# Patient Record
Sex: FEMALE | Race: WHITE | NOT HISPANIC OR LATINO | Employment: FULL TIME | URBAN - NONMETROPOLITAN AREA
[De-identification: names, ages, dates, MRNs, and addresses within clinical notes are randomized per-mention and may not be internally consistent; named-entity substitution may affect disease eponyms.]

---

## 2017-08-24 ENCOUNTER — TRANSCRIBE ORDERS (OUTPATIENT)
Dept: MAMMOGRAPHY | Facility: HOSPITAL | Age: 42
End: 2017-08-24

## 2017-08-24 DIAGNOSIS — Z13.9 SCREENING: Primary | ICD-10-CM

## 2017-09-12 ENCOUNTER — HOSPITAL ENCOUNTER (OUTPATIENT)
Dept: MAMMOGRAPHY | Facility: HOSPITAL | Age: 42
Discharge: HOME | End: 2017-09-12
Admitting: PHYSICIAN ASSISTANT

## 2017-09-12 DIAGNOSIS — Z13.9 SCREENING: ICD-10-CM

## 2017-09-12 PROCEDURE — G0202 SCR MAMMO BI INCL CAD: HCPCS

## 2017-09-12 PROCEDURE — 77063 BREAST TOMOSYNTHESIS BI: CPT

## 2018-05-23 ENCOUNTER — TRANSCRIBE ORDERS (OUTPATIENT)
Dept: ADMINISTRATIVE | Facility: HOSPITAL | Age: 43
End: 2018-05-23

## 2018-05-23 DIAGNOSIS — Z12.39 SCREENING BREAST EXAMINATION: Primary | ICD-10-CM

## 2018-05-29 RX ORDER — POTASSIUM CHLORIDE 750 MG/1
10 CAPSULE, EXTENDED RELEASE ORAL 2 TIMES DAILY
COMMUNITY

## 2018-05-29 RX ORDER — LEVOCETIRIZINE DIHYDROCHLORIDE 5 MG/1
5 TABLET, FILM COATED ORAL EVERY EVENING
COMMUNITY

## 2018-05-29 RX ORDER — TRAZODONE HYDROCHLORIDE 50 MG/1
50 TABLET ORAL NIGHTLY
COMMUNITY

## 2018-05-29 RX ORDER — HYDROCHLOROTHIAZIDE 25 MG/1
25 TABLET ORAL DAILY
COMMUNITY

## 2018-05-30 ENCOUNTER — OFFICE VISIT (OUTPATIENT)
Dept: SURGERY | Facility: CLINIC | Age: 43
End: 2018-05-30

## 2018-05-30 VITALS
BODY MASS INDEX: 45.75 KG/M2 | OXYGEN SATURATION: 98 % | HEIGHT: 64 IN | DIASTOLIC BLOOD PRESSURE: 96 MMHG | WEIGHT: 268 LBS | HEART RATE: 98 BPM | SYSTOLIC BLOOD PRESSURE: 138 MMHG | RESPIRATION RATE: 18 BRPM | TEMPERATURE: 98 F

## 2018-05-30 DIAGNOSIS — L98.9 SKIN LESIONS: Primary | ICD-10-CM

## 2018-05-30 DIAGNOSIS — D49.2 SKIN NEOPLASM: ICD-10-CM

## 2018-05-30 PROCEDURE — 99243 OFF/OP CNSLTJ NEW/EST LOW 30: CPT | Performed by: SURGERY

## 2018-05-30 NOTE — PROGRESS NOTES
"Patient: Melissa Klein    YOB: 1975    Date: 05/30/2018    Primary Care Provider: SERGIO Perdomo    Chief Complaint   Patient presents with   • Skin Lesion       Subjective .     History of present illness:  Pt is here today for evaluation of multiple irritated skin neoplasms which are located in bilateral axillary areas and the area \"around bra line,\" they have been present for @ many years.  Pt complains of \"irritation\" and \"redness and pain when they get caught on something.\"  Pt denies injury and/or trauma and she denies drainage at the sites.They do seem to have grown in size over the past year, they are tender to touch, they are irregular in nature, and seemed to have changed size and color recently.    The following portions of the patient's history were reviewed and updated as appropriate: allergies, current medications, past family history, past medical history, past social history, past surgical history and problem list.      Review of Systems   Constitutional: Negative for chills, fever and unexpected weight change.   HENT: Negative for hearing loss, trouble swallowing and voice change.    Eyes: Negative for visual disturbance.   Respiratory: Negative for apnea, cough, chest tightness, shortness of breath and wheezing.    Cardiovascular: Negative for chest pain, palpitations and leg swelling.   Gastrointestinal: Negative for abdominal distention, abdominal pain, anal bleeding, blood in stool, constipation, diarrhea, nausea, rectal pain and vomiting.   Endocrine: Negative for cold intolerance and heat intolerance.   Genitourinary: Negative for difficulty urinating, dysuria and flank pain.   Musculoskeletal: Negative for back pain and gait problem.   Skin: Positive for color change. Negative for rash and wound.   Neurological: Negative for dizziness, syncope, speech difficulty, weakness, light-headedness, numbness and headaches.   Hematological: Negative for adenopathy. Does " "not bruise/bleed easily.   Psychiatric/Behavioral: Negative for confusion. The patient is not nervous/anxious.        Allergies:  Allergies   Allergen Reactions   • Adhesive Tape Rash   • Latex Rash       Medications:    Current Outpatient Prescriptions:   •  hydrochlorothiazide (HYDRODIURIL) 25 MG tablet, Take 25 mg by mouth Daily., Disp: , Rfl:   •  levocetirizine (XYZAL) 5 MG tablet, Take 5 mg by mouth Every Evening., Disp: , Rfl:   •  potassium chloride (MICRO-K) 10 MEQ CR capsule, Take 10 mEq by mouth 2 (Two) Times a Day., Disp: , Rfl:   •  traZODone (DESYREL) 50 MG tablet, Take 50 mg by mouth Every Night., Disp: , Rfl:     History\"  Past Medical History:   Diagnosis Date   • Fatty liver    • Hypertension        Past Surgical History:   Procedure Laterality Date   • CHOLECYSTECTOMY     • TUBAL ABDOMINAL LIGATION         Family History   Problem Relation Age of Onset   • Diabetes Paternal Aunt        Social History   Substance Use Topics   • Smoking status: Former Smoker   • Smokeless tobacco: Never Used   • Alcohol use No        Objective     Vital Signs:   Vitals:    05/30/18 1253   BP: 138/96   Pulse: 98   Resp: 18   Temp: 98 °F (36.7 °C)   TempSrc: Temporal Artery    SpO2: 98%   Weight: 122 kg (268 lb)   Height: 162.6 cm (64\")       Physical Exam:   General Appearance:    Alert, cooperative, in no acute distress   Head:    Normocephalic, without obvious abnormality, atraumatic   Eyes:            Lids and lashes normal, conjunctivae and sclerae normal, no   icterus, no pallor, corneas clear, PERRLA   Ears:    Ears appear intact with no abnormalities noted   Throat:   No oral lesions, no thrush, oral mucosa moist   Neck:   No adenopathy, supple, trachea midline, no thyromegaly, no   carotid bruit, no JVD   Lungs:     Clear to auscultation,respirations regular, even and                  unlabored    Heart:    Regular rhythm and normal rate, normal S1 and S2, no            murmur, no gallop, no rub, no click "   Chest Wall:    No abnormalities observed   Abdomen:     Normal bowel sounds, no masses, no organomegaly, soft        non-tender, non-distended, no guarding, no rebound                tenderness   Extremities:   Moves all extremities well, no edema, no cyanosis, no             redness   Pulses:   Pulses palpable and equal bilaterally   Skin:   No bleeding, bruising or rash, multiple skin lesions over the axilla and under the bra line   Lymph nodes:   No palpable adenopathy   Neurologic:   Cranial nerves 2 - 12 grossly intact, sensation intact, DTR       present and equal bilaterally     Results Review:   I reviewed the patient's new clinical results.  I reviewed the patient's new imaging results and agree with the interpretation.  I reviewed the patient's other test results and agree with the interpretation    Assessment/Plan     1. Skin lesions        I did have a detailed and extensive discussion with the patient in the office today.  The full risks and benefits of operative versus nonoperative intervention were discussed with the paient, they understand, agree, and wish to proceed with the surgical treatment plan of skin neoplasm removal.    I discussed the patients findings and my recommendations with patient.    Review of Systems was reviewed and confirmed as accurate today.    Electronically signed by Eliazar Hurtado MD  05/30/18      .    Portions of this note have been scribed for Eliazar Hurtado MD by Suki Smith. 5/30/2018  1:21 PM

## 2018-07-09 ENCOUNTER — PROCEDURE VISIT (OUTPATIENT)
Dept: SURGERY | Facility: CLINIC | Age: 43
End: 2018-07-09

## 2018-07-09 VITALS
HEART RATE: 69 BPM | WEIGHT: 268.96 LBS | TEMPERATURE: 98.9 F | BODY MASS INDEX: 45.92 KG/M2 | SYSTOLIC BLOOD PRESSURE: 132 MMHG | OXYGEN SATURATION: 98 % | DIASTOLIC BLOOD PRESSURE: 82 MMHG | HEIGHT: 64 IN

## 2018-07-09 DIAGNOSIS — R22.31 MASS OF RIGHT AXILLA: Primary | ICD-10-CM

## 2018-07-09 DIAGNOSIS — L98.9 SKIN LESION: Primary | ICD-10-CM

## 2018-07-09 PROCEDURE — 12032 INTMD RPR S/A/T/EXT 2.6-7.5: CPT | Performed by: SURGERY

## 2018-07-09 PROCEDURE — 11403 EXC TR-EXT B9+MARG 2.1-3CM: CPT | Performed by: SURGERY

## 2018-07-09 NOTE — PROGRESS NOTES
Procedure: Excision  Location: Right axilla x 2    I recommend excision. Procedure and the risks and benefits were explained including bleeding and infection. The patient understands these and wishes to proceed.     The patient was brought to the procedure room. Consent and time out were performed. The area was prepped and draped in the usual fashion. 1% lidocaine with epinephrine was infused locally. An ellyptical incision was made around the lesion. Full thickness excision was performed. The lesion size was 2.1 cm. The wound was closed in layers with interrupted simple vicryl and Nylon for the skin. Wound closure size was 3 cm. There were no complications and the patient tolerated the procedure well. Hemostasis was well controlled with pressure and there was minimal blood loss. Wound instructions were given. This was in the right axilla.    The patient was brought to the procedure room. Consent and time out were performed. The area was prepped and draped in the usual fashion. 1% lidocaine with epinephrine was infused locally. An ellyptical incision was made around the lesion. Full thickness excision was performed. The lesion size was 2.2 cm. The wound was closed in layers with interrupted simple vicryl and Nylon for the skin. Wound closure size was 3 cm. There were no complications and the patient tolerated the procedure well. Hemostasis was well controlled with pressure and there was minimal blood loss. Wound instructions were given. This was a separate incision in a separate location in the right axilla.

## 2018-07-16 ENCOUNTER — PROCEDURE VISIT (OUTPATIENT)
Dept: SURGERY | Facility: CLINIC | Age: 43
End: 2018-07-16

## 2018-07-16 VITALS
DIASTOLIC BLOOD PRESSURE: 84 MMHG | OXYGEN SATURATION: 98 % | HEIGHT: 64 IN | BODY MASS INDEX: 45.75 KG/M2 | WEIGHT: 268 LBS | HEART RATE: 68 BPM | SYSTOLIC BLOOD PRESSURE: 130 MMHG | RESPIRATION RATE: 18 BRPM | TEMPERATURE: 98.4 F

## 2018-07-16 DIAGNOSIS — L98.9 SKIN LESIONS: Primary | ICD-10-CM

## 2018-07-16 DIAGNOSIS — D49.2 SKIN NEOPLASM: Primary | ICD-10-CM

## 2018-07-16 PROCEDURE — 12032 INTMD RPR S/A/T/EXT 2.6-7.5: CPT | Performed by: SURGERY

## 2018-07-16 PROCEDURE — 11403 EXC TR-EXT B9+MARG 2.1-3CM: CPT | Performed by: SURGERY

## 2018-07-16 NOTE — PROGRESS NOTES
"Patient: Melissa Klein    YOB: 1975    Date: 07/16/2018    Primary Care Provider: SERGIO Perdomo    Reason for Consultation: Follow-up lesion excision @ her right axilla which was done in the office on 07/09/2018.  Pt is also here for excision skin lesion(s) on her trunk.    Chief Complaint   Patient presents with   • Post-op Follow-up     excision skin lesions bilateral axilla.   • Skin Lesion     on trunk.       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  They state that they have done well and are having no problems.  Pt also complains of multiple skin lesions (mole like) on her trunk at her \"bra line,\" she complains of redness and irrittion @ the site.  Pt stated \"they get irritated by my bra!\"      Vital Signs:  Vitals:    07/16/18 1056   BP: 130/84   Pulse: 68   Resp: 18   Temp: 98.4 °F (36.9 °C)   TempSrc: Temporal Artery    SpO2: 98%   Weight: 122 kg (268 lb)   Height: 162.6 cm (64\")       Physical Exam:   General Appearance:    Alert, cooperative, in no acute distress, wound clean dry without infection   Abdomen:     no masses, no organomegaly, soft non-tender, non-distended, no guarding, wounds are well healed   Chest:      Clear to ausculation     Results Review:   I reviewed the patient's new clinical results.  I reviewed the patient's new imaging results and agree with the interpretation.  I reviewed the patient's other test results and agree with the interpretation    Assessment / Plan:    1. Skin neoplasm        I did discuss the situation with the patient today in the office and they have done well from their recent lesion excision, I don't think that the patient needs any further intervention and I need to see them back only if they have further problems. Pathology report was " reviewed with the patient in the office.    We did perform skin lesion excision that were suspicious in nature underneath the right breast.    Location: Right chest wall    Procedure: Skin lesion excision ×2      I recommend excision. Procedure and the risks and benefits were explained including bleeding and infection. The patient understands these and wishes to proceed.     The patient was brought to the procedure room. Consent and time out were performed. The area was prepped and draped in the usual fashion. 1% lidocaine with epinephrine was infused locally. An ellyptical incision was made around the lesion. Full thickness excision was performed. The lesion size was 2 cm. The wound was closed in layers with interrupted simple vicryl and Nylon for the skin. Wound closure size was 2.1 cm. There were no complications and the patient tolerated the procedure well. Hemostasis was well controlled with pressure and there was minimal blood loss. Wound instructions were given.     I should note that there was another skin lesion of the right chest wall below the right breast, this was also excised in the normal manner with 1% lidocaine injection.  Elliptical incisions were made in the lesion size was also 2 cm in nature and the wound closure size was 2.1 cm in nature.  Vicryl and nylon suture was used for interrupted skin closure.    Electronically signed by Eliazar Hurtado MD  07/17/18        Portions of this note have been scribed for Eliazar Hurtado MD by Suki Smith. 7/17/2018  9:19 AM

## 2018-07-23 ENCOUNTER — OFFICE VISIT (OUTPATIENT)
Dept: SURGERY | Facility: CLINIC | Age: 43
End: 2018-07-23

## 2018-07-23 VITALS
WEIGHT: 268 LBS | HEIGHT: 64 IN | SYSTOLIC BLOOD PRESSURE: 120 MMHG | BODY MASS INDEX: 45.75 KG/M2 | OXYGEN SATURATION: 98 % | RESPIRATION RATE: 20 BRPM | TEMPERATURE: 97.1 F | HEART RATE: 80 BPM | DIASTOLIC BLOOD PRESSURE: 90 MMHG

## 2018-07-23 DIAGNOSIS — Z48.89 POSTOPERATIVE VISIT: Primary | ICD-10-CM

## 2018-07-23 PROCEDURE — 99024 POSTOP FOLLOW-UP VISIT: CPT | Performed by: SURGERY

## 2018-07-23 NOTE — PROGRESS NOTES
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  They state that they have done well and are having no problems.      Vital Signs:  Vitals:    07/23/18 1000   BP: 120/90   Pulse: 80   Resp: 20   Temp: 97.1 °F (36.2 °C)   SpO2: 98%   Weight: 122 kg (268 lb)   Height: 162.6 cm (64\")       Physical Exam:   General Appearance:    Alert, cooperative, in no acute distress, wound clean dry without infection   Abdomen:     no masses, no organomegaly, soft non-tender, non-distended, no guarding, wounds are well healed   Chest:      Clear to ausculation     Results Review:   I reviewed the patient's new clinical results.  I reviewed the patient's new imaging results and agree with the interpretation.  I reviewed the patient's other test results and agree with the interpretation    Assessment / Plan:    1. Postoperative visit        I did discuss the situation with the patient today in the office and they have done well from their recent lesion excision, I don't think that the patient needs any further intervention and I need to see them back only if they have further problems. Pathology report was reviewed with the patient in the office.    Electronically signed by Eliazar Hurtado MD  07/30/18                Portions of this note have been scribed for Eliazar Hurtado MD by Liliane Varela MA 7/30/2018  1:45 PM          "

## 2018-08-06 ENCOUNTER — PROCEDURE VISIT (OUTPATIENT)
Dept: SURGERY | Facility: CLINIC | Age: 43
End: 2018-08-06

## 2018-08-06 VITALS
BODY MASS INDEX: 45.65 KG/M2 | HEIGHT: 64 IN | HEART RATE: 102 BPM | SYSTOLIC BLOOD PRESSURE: 118 MMHG | WEIGHT: 267.4 LBS | TEMPERATURE: 97.2 F | OXYGEN SATURATION: 98 % | DIASTOLIC BLOOD PRESSURE: 80 MMHG

## 2018-08-06 DIAGNOSIS — L98.9 SKIN LESION OF CHEST WALL: Primary | ICD-10-CM

## 2018-08-06 DIAGNOSIS — L98.9 SKIN LESION: Primary | ICD-10-CM

## 2018-08-06 PROCEDURE — 11404 EXC TR-EXT B9+MARG 3.1-4 CM: CPT | Performed by: SURGERY

## 2018-08-06 PROCEDURE — 12032 INTMD RPR S/A/T/EXT 2.6-7.5: CPT | Performed by: SURGERY

## 2018-08-06 NOTE — PROGRESS NOTES
Location: left chest wall    Procedure: Skin lesion excision      I recommend excision. Procedure and the risks and benefits were explained including bleeding and infection. The patient understands these and wishes to proceed.     The patient was brought to the procedure room. Consent and time out were performed. The area was prepped and draped in the usual fashion. 1% lidocaine with epinephrine was infused locally. An ellyptical incision was made around the lesion. Full thickness excision was performed. The lesion size was 3 cm. The wound was closed in layers with interrupted simple vicryl and Nylon for the skin. Wound closure size was 3.5 cm. There were no complications and the patient tolerated the procedure well. Hemostasis was well controlled with pressure and there was minimal blood loss. Wound instructions were given.

## 2018-08-13 ENCOUNTER — PROCEDURE VISIT (OUTPATIENT)
Dept: SURGERY | Facility: CLINIC | Age: 43
End: 2018-08-13

## 2018-08-13 VITALS
SYSTOLIC BLOOD PRESSURE: 120 MMHG | HEART RATE: 69 BPM | BODY MASS INDEX: 45.58 KG/M2 | HEIGHT: 64 IN | DIASTOLIC BLOOD PRESSURE: 80 MMHG | TEMPERATURE: 97.2 F | OXYGEN SATURATION: 98 % | WEIGHT: 267 LBS

## 2018-08-13 DIAGNOSIS — L98.9 SKIN LESION: Primary | ICD-10-CM

## 2018-08-13 DIAGNOSIS — D49.2 SKIN NEOPLASM: ICD-10-CM

## 2018-08-13 PROCEDURE — 11403 EXC TR-EXT B9+MARG 2.1-3CM: CPT | Performed by: SURGERY

## 2018-08-13 PROCEDURE — 12032 INTMD RPR S/A/T/EXT 2.6-7.5: CPT | Performed by: SURGERY

## 2018-08-13 NOTE — PROGRESS NOTES
Location: Left axilla    Procedure: excision of skin lesion      I recommend excision. Procedure and the risks and benefits were explained including bleeding and infection. The patient understands these and wishes to proceed. Patient was seen in the office today for post op excision, the path report did show benign fibroepithelial polyp. Patient states that she is doing well and has no complaints.    The patient was brought to the procedure room. Consent and time out were performed. The area was prepped and draped in the usual fashion. 1% lidocaine with epinephrine was infused locally. An ellyptical incision was made around the lesion. Full thickness excision was performed. The lesion size was 2.3 cm. The wound was closed in layers with interrupted simple vicryl and Nylon for the skin. Wound closure size was 3 cm. There were no complications and the patient tolerated the procedure well. Hemostasis was well controlled with pressure and there was minimal blood loss. Wound instructions were given.

## 2018-08-20 ENCOUNTER — OFFICE VISIT (OUTPATIENT)
Dept: SURGERY | Facility: CLINIC | Age: 43
End: 2018-08-20

## 2018-08-20 VITALS
SYSTOLIC BLOOD PRESSURE: 122 MMHG | DIASTOLIC BLOOD PRESSURE: 84 MMHG | RESPIRATION RATE: 18 BRPM | TEMPERATURE: 98.7 F | HEART RATE: 76 BPM | HEIGHT: 64 IN | WEIGHT: 266 LBS | OXYGEN SATURATION: 97 % | BODY MASS INDEX: 45.41 KG/M2

## 2018-08-20 DIAGNOSIS — L98.9 SKIN LESION OF CHEST WALL: Primary | ICD-10-CM

## 2018-08-20 PROCEDURE — 99024 POSTOP FOLLOW-UP VISIT: CPT | Performed by: SURGERY

## 2018-08-20 NOTE — PROGRESS NOTES
"Patient: Melissa Klein    YOB: 1975    Date: 08/20/2018    Primary Care Provider: Malia Edmonds PA    Reason for Consultation: Follow-up lesion excision on chest.    Chief Complaint   Patient presents with   • Post-op Follow-up     excision skin lesion on chest.       History of present illness:  I saw the patient in the office today as a followup from their recent lesion excision on the chest wall which was done in the office on 08/13/2018, the pathology report did show a fibroepithelial polyp  They state that they have done well and are having no problems.      Vital Signs:  Vitals:    08/20/18 0853   BP: 122/84   Pulse: 76   Resp: 18   Temp: 98.7 °F (37.1 °C)   TempSrc: Temporal Artery    SpO2: 97%   Weight: 121 kg (266 lb)   Height: 162.6 cm (64\")       Physical Exam:   General Appearance:    Alert, cooperative, in no acute distress, wound clean dry without infection   Abdomen:     no masses, no organomegaly, soft non-tender, non-distended, no guarding, wounds are well healed   Chest:      Clear to ausculation     Results Review:   I reviewed the patient's new clinical results.  I reviewed the patient's new imaging results and agree with the interpretation.  I reviewed the patient's other test results and agree with the interpretation    Assessment / Plan:    1. Skin lesion of chest wall        I did discuss the situation with the patient today in the office and they have done well from their recent lesion excision, I don't think that the patient needs any further intervention and I need to see them back only if they have further problems. Pathology report was reviewed with the patient in the office.    Electronically signed by Eliazar Hurtado MD  08/22/18            Portions of this note have been scribed for Eliazar Hurtado MD by Suki Smith. 8/22/2018  12:48 PM          "

## 2018-09-14 ENCOUNTER — HOSPITAL ENCOUNTER (OUTPATIENT)
Dept: MAMMOGRAPHY | Facility: HOSPITAL | Age: 43
Discharge: HOME | End: 2018-09-14
Admitting: PHYSICIAN ASSISTANT

## 2018-09-14 DIAGNOSIS — Z12.39 SCREENING BREAST EXAMINATION: ICD-10-CM

## 2018-09-14 PROCEDURE — 77063 BREAST TOMOSYNTHESIS BI: CPT

## 2018-09-14 PROCEDURE — 77067 SCR MAMMO BI INCL CAD: CPT

## 2019-10-07 ENCOUNTER — TRANSCRIBE ORDERS (OUTPATIENT)
Dept: ADMINISTRATIVE | Facility: HOSPITAL | Age: 44
End: 2019-10-07

## 2019-10-07 DIAGNOSIS — Z12.31 ENCOUNTER FOR SCREENING MAMMOGRAM FOR MALIGNANT NEOPLASM OF BREAST: Primary | ICD-10-CM

## 2019-12-06 ENCOUNTER — HOSPITAL ENCOUNTER (OUTPATIENT)
Dept: MAMMOGRAPHY | Facility: HOSPITAL | Age: 44
Discharge: HOME | End: 2019-12-06
Admitting: PHYSICIAN ASSISTANT

## 2019-12-06 DIAGNOSIS — Z12.31 ENCOUNTER FOR SCREENING MAMMOGRAM FOR MALIGNANT NEOPLASM OF BREAST: ICD-10-CM

## 2019-12-06 PROCEDURE — 77063 BREAST TOMOSYNTHESIS BI: CPT

## 2019-12-06 PROCEDURE — 77067 SCR MAMMO BI INCL CAD: CPT

## 2021-02-18 ENCOUNTER — TRANSCRIBE ORDERS (OUTPATIENT)
Dept: NUTRITION | Facility: HOSPITAL | Age: 46
End: 2021-02-18

## 2021-02-18 DIAGNOSIS — Z12.31 ENCOUNTER FOR SCREENING MAMMOGRAM FOR MALIGNANT NEOPLASM OF BREAST: Primary | ICD-10-CM

## 2021-04-02 ENCOUNTER — HOSPITAL ENCOUNTER (OUTPATIENT)
Dept: MAMMOGRAPHY | Facility: HOSPITAL | Age: 46
Discharge: HOME | End: 2021-04-02
Admitting: FAMILY MEDICINE

## 2021-04-02 DIAGNOSIS — Z12.31 ENCOUNTER FOR SCREENING MAMMOGRAM FOR MALIGNANT NEOPLASM OF BREAST: ICD-10-CM

## 2021-04-02 PROCEDURE — 77063 BREAST TOMOSYNTHESIS BI: CPT

## 2021-04-02 PROCEDURE — 77067 SCR MAMMO BI INCL CAD: CPT

## 2021-09-09 ENCOUNTER — LAB (OUTPATIENT)
Dept: LAB | Facility: HOSPITAL | Age: 46
End: 2021-09-09

## 2021-09-09 ENCOUNTER — TRANSCRIBE ORDERS (OUTPATIENT)
Dept: LAB | Facility: HOSPITAL | Age: 46
End: 2021-09-09

## 2021-09-09 DIAGNOSIS — M54.50 LOW BACK PAIN, UNSPECIFIED BACK PAIN LATERALITY, UNSPECIFIED CHRONICITY, UNSPECIFIED WHETHER SCIATICA PRESENT: ICD-10-CM

## 2021-09-09 DIAGNOSIS — M54.50 LOW BACK PAIN, UNSPECIFIED BACK PAIN LATERALITY, UNSPECIFIED CHRONICITY, UNSPECIFIED WHETHER SCIATICA PRESENT: Primary | ICD-10-CM

## 2021-09-09 LAB
ALBUMIN SERPL-MCNC: 3.9 G/DL (ref 3.5–5.2)
ANION GAP SERPL CALCULATED.3IONS-SCNC: 11.7 MMOL/L (ref 5–15)
BUN SERPL-MCNC: 13 MG/DL (ref 6–20)
BUN/CREAT SERPL: 9.8 (ref 7–25)
CALCIUM SPEC-SCNC: 8.7 MG/DL (ref 8.6–10.5)
CHLORIDE SERPL-SCNC: 100 MMOL/L (ref 98–107)
CO2 SERPL-SCNC: 26.3 MMOL/L (ref 22–29)
CREAT SERPL-MCNC: 1.33 MG/DL (ref 0.57–1)
GFR SERPL CREATININE-BSD FRML MDRD: 43 ML/MIN/1.73
GLUCOSE SERPL-MCNC: 96 MG/DL (ref 65–99)
PHOSPHATE SERPL-MCNC: 2 MG/DL (ref 2.5–4.5)
POTASSIUM SERPL-SCNC: 3.8 MMOL/L (ref 3.5–5.2)
SODIUM SERPL-SCNC: 138 MMOL/L (ref 136–145)

## 2021-09-09 PROCEDURE — 80069 RENAL FUNCTION PANEL: CPT

## 2021-09-09 PROCEDURE — 36415 COLL VENOUS BLD VENIPUNCTURE: CPT

## 2021-09-12 ENCOUNTER — HOSPITAL ENCOUNTER (EMERGENCY)
Facility: HOSPITAL | Age: 46
Discharge: HOME | End: 2021-09-12
Attending: EMERGENCY MEDICINE | Admitting: EMERGENCY MEDICINE

## 2021-09-12 ENCOUNTER — APPOINTMENT (OUTPATIENT)
Dept: CT IMAGING | Facility: HOSPITAL | Age: 46
End: 2021-09-12

## 2021-09-12 VITALS
HEART RATE: 81 BPM | WEIGHT: 258 LBS | SYSTOLIC BLOOD PRESSURE: 123 MMHG | BODY MASS INDEX: 44.05 KG/M2 | DIASTOLIC BLOOD PRESSURE: 85 MMHG | HEIGHT: 64 IN | RESPIRATION RATE: 18 BRPM | OXYGEN SATURATION: 97 % | TEMPERATURE: 98.5 F

## 2021-09-12 DIAGNOSIS — R31.9 HEMATURIA, UNSPECIFIED TYPE: Primary | ICD-10-CM

## 2021-09-12 DIAGNOSIS — J12.9 VIRAL PNEUMONIA: ICD-10-CM

## 2021-09-12 DIAGNOSIS — R10.9 FLANK PAIN: ICD-10-CM

## 2021-09-12 DIAGNOSIS — U07.1 COVID-19: ICD-10-CM

## 2021-09-12 LAB
ALBUMIN SERPL-MCNC: 4.1 G/DL (ref 3.5–5.2)
ALBUMIN/GLOB SERPL: 1.3 G/DL
ALP SERPL-CCNC: 80 U/L (ref 39–117)
ALT SERPL W P-5'-P-CCNC: 43 U/L (ref 1–33)
ANION GAP SERPL CALCULATED.3IONS-SCNC: 16.2 MMOL/L (ref 5–15)
AST SERPL-CCNC: 57 U/L (ref 1–32)
B-HCG UR QL: NEGATIVE
BACTERIA UR QL AUTO: ABNORMAL /HPF
BILIRUB SERPL-MCNC: 0.4 MG/DL (ref 0–1.2)
BILIRUB UR QL STRIP: NEGATIVE
BUN SERPL-MCNC: 19 MG/DL (ref 6–20)
BUN/CREAT SERPL: 13.9 (ref 7–25)
CALCIUM SPEC-SCNC: 8.8 MG/DL (ref 8.6–10.5)
CHLORIDE SERPL-SCNC: 99 MMOL/L (ref 98–107)
CLARITY UR: ABNORMAL
CO2 SERPL-SCNC: 22.8 MMOL/L (ref 22–29)
COLOR UR: ABNORMAL
CREAT SERPL-MCNC: 1.37 MG/DL (ref 0.57–1)
DEPRECATED RDW RBC AUTO: 46.2 FL (ref 37–54)
ERYTHROCYTE [DISTWIDTH] IN BLOOD BY AUTOMATED COUNT: 17.5 % (ref 12.3–15.4)
GFR SERPL CREATININE-BSD FRML MDRD: 42 ML/MIN/1.73
GLOBULIN UR ELPH-MCNC: 3.1 GM/DL
GLUCOSE SERPL-MCNC: 99 MG/DL (ref 65–99)
GLUCOSE UR STRIP-MCNC: NEGATIVE MG/DL
HCG SERPL QL: NEGATIVE
HCT VFR BLD AUTO: 47.4 % (ref 34–46.6)
HGB BLD-MCNC: 15.7 G/DL (ref 12–15.9)
HGB UR QL STRIP.AUTO: ABNORMAL
HOLD SPECIMEN: NORMAL
HOLD SPECIMEN: NORMAL
HYALINE CASTS UR QL AUTO: ABNORMAL /LPF
KETONES UR QL STRIP: NEGATIVE
LEUKOCYTE ESTERASE UR QL STRIP.AUTO: ABNORMAL
LIPASE SERPL-CCNC: 57 U/L (ref 13–60)
LYMPHOCYTES # BLD MANUAL: 0.98 10*3/MM3 (ref 0.7–3.1)
LYMPHOCYTES NFR BLD MANUAL: 18 % (ref 19.6–45.3)
LYMPHOCYTES NFR BLD MANUAL: 7 % (ref 5–12)
MCH RBC QN AUTO: 25.5 PG (ref 26.6–33)
MCHC RBC AUTO-ENTMCNC: 33.1 G/DL (ref 31.5–35.7)
MCV RBC AUTO: 77.1 FL (ref 79–97)
METAMYELOCYTES NFR BLD MANUAL: 1 % (ref 0–0)
MICROCYTES BLD QL: ABNORMAL
MONOCYTES # BLD AUTO: 0.27 10*3/MM3 (ref 0.1–0.9)
NEUTROPHILS # BLD AUTO: 2.61 10*3/MM3 (ref 1.7–7)
NEUTROPHILS NFR BLD MANUAL: 65 % (ref 42.7–76)
NEUTS BAND NFR BLD MANUAL: 2 % (ref 0–5)
NITRITE UR QL STRIP: NEGATIVE
PH UR STRIP.AUTO: 5 [PH] (ref 5–8)
PLATELET # BLD AUTO: 134 10*3/MM3 (ref 140–450)
PMV BLD AUTO: 10.3 FL (ref 6–12)
POTASSIUM SERPL-SCNC: 4.1 MMOL/L (ref 3.5–5.2)
PROT SERPL-MCNC: 7.2 G/DL (ref 6–8.5)
PROT UR QL STRIP: ABNORMAL
RBC # BLD AUTO: 6.15 10*6/MM3 (ref 3.77–5.28)
RBC # UR: ABNORMAL /HPF
REF LAB TEST METHOD: ABNORMAL
SARS-COV-2 RNA PNL SPEC NAA+PROBE: DETECTED
SCAN SLIDE: NORMAL
SMALL PLATELETS BLD QL SMEAR: ABNORMAL
SODIUM SERPL-SCNC: 138 MMOL/L (ref 136–145)
SP GR UR STRIP: 1.02 (ref 1–1.03)
SQUAMOUS #/AREA URNS HPF: ABNORMAL /HPF
UROBILINOGEN UR QL STRIP: ABNORMAL
VARIANT LYMPHS NFR BLD MANUAL: 7 % (ref 0–5)
WBC # BLD AUTO: 3.9 10*3/MM3 (ref 3.4–10.8)
WBC MORPH BLD: NORMAL
WBC UR QL AUTO: ABNORMAL /HPF
WHOLE BLOOD HOLD SPECIMEN: NORMAL
WHOLE BLOOD HOLD SPECIMEN: NORMAL

## 2021-09-12 PROCEDURE — 81025 URINE PREGNANCY TEST: CPT | Performed by: PHYSICIAN ASSISTANT

## 2021-09-12 PROCEDURE — 87635 SARS-COV-2 COVID-19 AMP PRB: CPT | Performed by: PHYSICIAN ASSISTANT

## 2021-09-12 PROCEDURE — 80053 COMPREHEN METABOLIC PANEL: CPT | Performed by: PHYSICIAN ASSISTANT

## 2021-09-12 PROCEDURE — 99283 EMERGENCY DEPT VISIT LOW MDM: CPT

## 2021-09-12 PROCEDURE — 81001 URINALYSIS AUTO W/SCOPE: CPT | Performed by: PHYSICIAN ASSISTANT

## 2021-09-12 PROCEDURE — 74176 CT ABD & PELVIS W/O CONTRAST: CPT

## 2021-09-12 PROCEDURE — 84703 CHORIONIC GONADOTROPIN ASSAY: CPT | Performed by: PHYSICIAN ASSISTANT

## 2021-09-12 PROCEDURE — 85025 COMPLETE CBC W/AUTO DIFF WBC: CPT | Performed by: PHYSICIAN ASSISTANT

## 2021-09-12 PROCEDURE — 83690 ASSAY OF LIPASE: CPT | Performed by: PHYSICIAN ASSISTANT

## 2021-09-12 PROCEDURE — 85007 BL SMEAR W/DIFF WBC COUNT: CPT | Performed by: PHYSICIAN ASSISTANT

## 2021-09-12 RX ORDER — SODIUM CHLORIDE 0.9 % (FLUSH) 0.9 %
10 SYRINGE (ML) INJECTION AS NEEDED
Status: DISCONTINUED | OUTPATIENT
Start: 2021-09-12 | End: 2021-09-12 | Stop reason: HOSPADM

## 2021-09-12 RX ORDER — ONDANSETRON 4 MG/1
4 TABLET, ORALLY DISINTEGRATING ORAL EVERY 6 HOURS PRN
Qty: 8 TABLET | Refills: 0 | Status: SHIPPED | OUTPATIENT
Start: 2021-09-12 | End: 2021-09-14

## 2021-09-12 RX ORDER — LANOLIN ALCOHOL/MO/W.PET/CERES
400 CREAM (GRAM) TOPICAL DAILY
COMMUNITY

## 2021-09-12 RX ORDER — CEFDINIR 300 MG/1
300 CAPSULE ORAL 2 TIMES DAILY
Qty: 14 CAPSULE | Refills: 0 | Status: SHIPPED | OUTPATIENT
Start: 2021-09-12 | End: 2021-09-19

## 2021-09-12 RX ORDER — IBUPROFEN 800 MG/1
800 TABLET ORAL ONCE
Status: COMPLETED | OUTPATIENT
Start: 2021-09-12 | End: 2021-09-12

## 2021-09-12 RX ADMIN — SODIUM CHLORIDE 500 ML: 9 INJECTION, SOLUTION INTRAVENOUS at 16:53

## 2021-09-12 RX ADMIN — IBUPROFEN 800 MG: 800 TABLET ORAL at 17:27

## 2021-09-12 RX ADMIN — SODIUM CHLORIDE 1000 ML: 9 INJECTION, SOLUTION INTRAVENOUS at 13:11

## 2021-09-12 NOTE — DISCHARGE INSTRUCTIONS
You did have blood in your urine, will switch your antibiotic to Omnicef given you do have an elevation of your kidney function.  Discontinue the Bactrim and take the Omnicef instead.  Drink plenty of fluids to stay well-hydrated.  Take Zofran as needed for nausea and vomiting.  You did test positive for COVID-19 as well.  May alternate ibuprofen and Tylenol as needed.  You need to quarantine per health department recommendations.  Return to the ER for any change in worsening symptoms, or any additional concerns including limited to inability to urinate, severe shortness of breath, chest pain, intractable vomiting.

## 2021-09-12 NOTE — ED PROVIDER NOTES
Subjective   Patient is a 46-year-old female the history of hypertension and fatty liver presenting to the ER for evaluation of left flank pain.  Patient states she had left flank pain that started on Thursday 09/09/21.  She states she saw her primary care provider on Friday who placed her on Bactrim.  She states she has been taking this but has had persistent pain in her left flank.  She states she has had renal failure once before when she was pregnant.  She denies any fever, chills, nausea, emesis, diarrhea, chest pain, shortness of breath, hemoptysis, syncope, or any other symptoms.  Denies any known history of kidney stones.          Review of Systems   Constitutional: Negative for chills and fever.   HENT: Negative.    Eyes: Negative.    Respiratory: Negative.    Cardiovascular: Negative.    Gastrointestinal: Negative.    Genitourinary: Positive for flank pain. Negative for hematuria.   Musculoskeletal: Positive for back pain.   Skin: Negative.    Allergic/Immunologic: Negative for immunocompromised state.   Neurological: Negative.    Psychiatric/Behavioral: Negative.        Past Medical History:   Diagnosis Date   • Fatty liver    • Hypertension        Allergies   Allergen Reactions   • Adhesive Tape Rash   • Latex Rash       Past Surgical History:   Procedure Laterality Date   • CHOLECYSTECTOMY     • TUBAL ABDOMINAL LIGATION         Family History   Problem Relation Age of Onset   • Diabetes Paternal Aunt    • No Known Problems Mother        Social History     Socioeconomic History   • Marital status: Single     Spouse name: Not on file   • Number of children: Not on file   • Years of education: Not on file   • Highest education level: Not on file   Tobacco Use   • Smoking status: Former Smoker   • Smokeless tobacco: Never Used   Substance and Sexual Activity   • Alcohol use: No   • Drug use: No   • Sexual activity: Defer           Objective   Physical Exam  Vitals and nursing note reviewed.     /85    "Pulse 81   Temp 98.5 °F (36.9 °C) (Oral)   Resp 18   Ht 162.6 cm (64\")   Wt 117 kg (258 lb)   LMP 09/11/2021   SpO2 97%   BMI 44.29 kg/m²     GEN: No acute distress,   Sitting up in stretcher.  She is awake and alert.  She does not appear septic or toxic.  She is answering questions appropriately.  Head: Normocephalic, atraumatic  Eyes: EOM intact  ENT: Mask in place per protocol   Cardiovascular: Regular rate and rhythm  Lungs: Clear to auscultation bilaterally without adventitious sounds  Abdomen: Soft, nontender, nondistended, no peritoneal signs, no focal guarding  Back: Mild left-sided CVA tenderness  Extremities: No edema, normal appearance, full range of motion  Neuro: GCS 15  Psych: Mood and affect are appropriate    Procedures           ED Course  ED Course as of Sep 12 2127   Sun Sep 12, 2021   1339 Glucose: 99 [LA]   1339 BUN: 19 [LA]   1339 Patient's creatinine is only mildly elevated comparison to 3 days ago.  Giving fluids.   Creatinine(!): 1.37 [LA]   1339 Sodium: 138 [LA]   1339 Potassium: 4.1 [LA]   1339 Chloride: 99 [LA]   1339 CO2: 22.8 [LA]   1339 Calcium: 8.8 [LA]   1339 Total Protein: 7.2 [LA]   1339 Albumin: 4.10 [LA]   1339 ALT (SGPT)(!): 43 [LA]   1339 AST (SGOT)(!): 57 [LA]   1339 Alkaline Phosphatase: 80 [LA]   1339 Total Bilirubin: 0.4 [LA]   1339 eGFR Non  Am(!): 42 [LA]   1339 Globulin: 3.1 [LA]   1339 A/G Ratio: 1.3 [LA]   1339 BUN/Creatinine Ratio: 13.9 [LA]   1339 Anion Gap(!): 16.2 [LA]   1339 Lipase: 57 [LA]   1412 Hemoglobin: 15.7 [LA]   1413 Hematocrit(!): 47.4 [LA]   1413 WBC: 3.90 [LA]   1413 Neutrophil Rel %: 65.0 [LA]   1413 Lymphocyte Rel %(!): 18.0 [LA]   1413 Monocyte Rel %: 7.0 [LA]   1413 Bands Rel % : 2.0 [LA]   1413 Metamyelocyte %(!): 1.0 [LA]   1413 Atypical Lymphocyte %(!): 7.0 [LA]   1413 Neutrophils Absolute: 2.61 [LA]   1413 Lymphocytes Absolute: 0.98 [LA]   1413 Monocytes Absolute: 0.27 [LA]   1413 Microcytes: Slight/1+ [LA]   1413 WBC Morphology: " Normal [LA]   1413 Platelet Estimate: Decreased [LA]   1505 HCG Qualitative: Negative [LA]   1711 FINAL REPORT     TECHNIQUE:  Routine axial images through the abdomen and pelvis were  obtained.     CLINICAL HISTORY:  Left flank pain     FINDINGS:  Abdomen: There are multifocal bilateral patchy areas of  groundglass/airspace opacity consistent with pneumonia,  including viral pneumonia.  The gallbladder has been removed.  There is mild to moderate left renal atrophy.  The solid  abdominal organs and ureters are otherwise unremarkable.  There  is a small fat-containing paraumbilical hernia.  The GI tract is  unremarkable, including the appendix.  Pelvis: There is an  incidental nabothian cyst.  The uterus, ovaries and urinary  bladder are otherwise normal. There is no pelvic or abdominal  ascites, adenopathy or acute osseous abnormality.     IMPRESSION:  Viral pneumonia.  No acute disease of the abdomen or pelvis.     Authenticated by Luiz Meek M.D. on 09/12/2021 05:08:04 PM    [LA]   1809 COVID19(!!): Detected [LA]   1809 HCG, Urine QL: Negative [LA]   1812 Patient lying comfortable in the stretcher.  Her vital signs are stable at this time.    [LA]   1813 RBC, UA(!): 31-50 [LA]   1813 WBC, UA(!): 0-2 [LA]   1813 Bacteria, UA(!): Trace [LA]   1813 Squamous Epithelial Cells, UA: 0-2 [LA]   1813 Hyaline Casts, UA: None Seen [LA]   1813 Methodology:: Manual Light Microscopy [LA]   1813 Color, UA(!): Dark Yellow [LA]   1813 Appearance, UA(!): Cloudy [LA]   1813 pH, UA: 5.0 [LA]   1813 Specific Gravity, UA: 1.025 [LA]   1813 Glucose: Negative [LA]   1813 Ketones, UA: Negative [LA]   1813 Bilirubin, UA: Negative [LA]   1813 Blood, UA(!): Large (3+) [LA]   1813 Protein, UA(!): 30 mg/dL (1+) [LA]   1813 Leukocytes, UA(!): Trace [LA]   1813 Nitrite, UA: Negative [LA]   1813 Urobilinogen, UA: 0.2 E.U./dL [LA]   1814 Patient does have 31-50 red blood cells with trace leukocytes.  Given her symptoms, they could be from Covid  but will cover for infection as well.  Given her creatinine is bumped a bit, will switch to Omnicef, send urine for culture.    [LA]      ED Course User Index  [LA] Noemí Zuleta PA-C                                           MDM  Number of Diagnoses or Management Options  COVID-19  Flank pain  Hematuria, unspecified type  Viral pneumonia  Diagnosis management comments: On arrival, patient is stable.  She is afebrile here.  Differential could include pyelonephritis, nephrolithiasis, dehydration, electrolyte abnormalities, acute kidney injury, and other concerns.  Will obtain basic labs, lipase, urinalysis, UPT.  Will likely obtain CT the abdomen pelvis to rule out underlying kidney stone.    Labs revealed no significant leukocytosis or electrolyte normalities.  She had a creatinine of 1.37 which was mildly elevated from 1.33 3 days ago.  Patient has been on Bactrim.  IV fluids have been initiated.  Her urine had some blood, protein and leukocytes, only 31-50 red blood cells and 0-2 white blood cells.  CT revealed a viral pneumonia but no obstructing uropathy or other findings.  I did obtain a COVID-19 swab at this time and she was positive.  Discussed with Dr. Lucas.  Given that she has hematuria, we will continue to treat urinary tract infection but given her kidney function with this, will switch to cefdinir.  We will give her Zofran, other symptomatic treatment.  Discussed quarantine, follow-up, strict return precautions.  She verbalized understanding and was in agreement with this plan of care.       Amount and/or Complexity of Data Reviewed  Clinical lab tests: reviewed and ordered  Tests in the radiology section of CPT®: reviewed and ordered  Discussion of test results with the performing providers: yes  Decide to obtain previous medical records or to obtain history from someone other than the patient: yes  Review and summarize past medical records: yes  Discuss the patient with other providers:  yes    Risk of Complications, Morbidity, and/or Mortality  Presenting problems: moderate  Diagnostic procedures: moderate  Management options: low    Patient Progress  Patient progress: stable      Final diagnoses:   Hematuria, unspecified type   Flank pain   Viral pneumonia   COVID-19       ED Disposition  ED Disposition     ED Disposition Condition Comment    Discharge Stable           Grace Mcmillan, DO  [address] [facility] [address]  [phone]    Schedule an appointment as soon as possible for a visit            Medication List      New Prescriptions    cefdinir 300 MG capsule  Commonly known as: OMNICEF  Take 1 capsule by mouth 2 (Two) Times a Day for 7 days.     ondansetron ODT 4 MG disintegrating tablet  Commonly known as: ZOFRAN-ODT  Place 1 tablet on the tongue Every 6 (Six) Hours As Needed for Nausea or Vomiting for up to 2 days.           Where to Get Your Medications      These medications were sent to [facility] [address] [place] [address] - [address] AT [facility] - [phone] [facility] [phone] [facility] [address]    Phone: [phone]   · cefdinir 300 MG capsule  · ondansetron ODT 4 MG disintegrating tablet          Noemí Zuleta PA-C  09/12/21 8559

## 2022-02-23 ENCOUNTER — TRANSCRIBE ORDERS (OUTPATIENT)
Dept: ADMINISTRATIVE | Facility: HOSPITAL | Age: 47
End: 2022-02-23

## 2022-02-23 DIAGNOSIS — Z12.31 VISIT FOR SCREENING MAMMOGRAM: Primary | ICD-10-CM

## 2022-04-11 ENCOUNTER — TRANSCRIBE ORDERS (OUTPATIENT)
Dept: ADMINISTRATIVE | Facility: HOSPITAL | Age: 47
End: 2022-04-11

## 2022-04-11 ENCOUNTER — HOSPITAL ENCOUNTER (OUTPATIENT)
Dept: MAMMOGRAPHY | Facility: HOSPITAL | Age: 47
Discharge: HOME | End: 2022-04-11
Admitting: FAMILY MEDICINE

## 2022-04-11 DIAGNOSIS — R92.8 OTHER ABNORMAL AND INCONCLUSIVE FINDINGS ON DIAGNOSTIC IMAGING OF BREAST: Primary | ICD-10-CM

## 2022-04-11 DIAGNOSIS — Z12.31 VISIT FOR SCREENING MAMMOGRAM: ICD-10-CM

## 2022-04-11 PROCEDURE — 77067 SCR MAMMO BI INCL CAD: CPT

## 2022-04-11 PROCEDURE — 77063 BREAST TOMOSYNTHESIS BI: CPT

## 2022-05-04 ENCOUNTER — HOSPITAL ENCOUNTER (OUTPATIENT)
Dept: MAMMOGRAPHY | Facility: HOSPITAL | Age: 47
Discharge: HOME | End: 2022-05-04

## 2022-05-04 ENCOUNTER — HOSPITAL ENCOUNTER (OUTPATIENT)
Dept: ULTRASOUND IMAGING | Facility: HOSPITAL | Age: 47
Discharge: HOME | End: 2022-05-04

## 2022-05-04 DIAGNOSIS — R92.8 OTHER ABNORMAL AND INCONCLUSIVE FINDINGS ON DIAGNOSTIC IMAGING OF BREAST: ICD-10-CM

## 2022-05-04 PROCEDURE — G0279 TOMOSYNTHESIS, MAMMO: HCPCS

## 2022-05-04 PROCEDURE — 76642 ULTRASOUND BREAST LIMITED: CPT

## 2022-05-04 PROCEDURE — 77066 DX MAMMO INCL CAD BI: CPT

## 2022-10-10 ENCOUNTER — TRANSCRIBE ORDERS (OUTPATIENT)
Dept: ADMINISTRATIVE | Facility: HOSPITAL | Age: 47
End: 2022-10-10

## 2022-10-10 DIAGNOSIS — R92.8 ABNORMAL MAMMOGRAM: Primary | ICD-10-CM

## 2022-11-28 ENCOUNTER — HOSPITAL ENCOUNTER (OUTPATIENT)
Dept: ULTRASOUND IMAGING | Facility: HOSPITAL | Age: 47
Discharge: HOME | End: 2022-11-28

## 2022-11-28 ENCOUNTER — HOSPITAL ENCOUNTER (OUTPATIENT)
Dept: MAMMOGRAPHY | Facility: HOSPITAL | Age: 47
Discharge: HOME | End: 2022-11-28

## 2022-11-28 DIAGNOSIS — R92.8 ABNORMAL MAMMOGRAM: ICD-10-CM

## 2022-11-28 PROCEDURE — G0279 TOMOSYNTHESIS, MAMMO: HCPCS

## 2022-11-28 PROCEDURE — 77066 DX MAMMO INCL CAD BI: CPT

## 2022-11-28 PROCEDURE — 76642 ULTRASOUND BREAST LIMITED: CPT

## 2023-05-05 ENCOUNTER — TRANSCRIBE ORDERS (OUTPATIENT)
Dept: ADMINISTRATIVE | Facility: HOSPITAL | Age: 48
End: 2023-05-05
Payer: MEDICAID

## 2023-05-05 DIAGNOSIS — Z12.31 ENCOUNTER FOR SCREENING MAMMOGRAM FOR BREAST CANCER: ICD-10-CM

## 2023-05-05 DIAGNOSIS — Z09 EXAMINATION FOR, FOLLOW-UP: Primary | ICD-10-CM

## 2023-08-07 ENCOUNTER — HOSPITAL ENCOUNTER (OUTPATIENT)
Dept: MAMMOGRAPHY | Facility: HOSPITAL | Age: 48
Discharge: HOME | End: 2023-08-07
Admitting: FAMILY MEDICINE
Payer: MEDICAID

## 2023-08-07 DIAGNOSIS — Z12.31 ENCOUNTER FOR SCREENING MAMMOGRAM FOR BREAST CANCER: ICD-10-CM

## 2023-08-07 PROCEDURE — 77067 SCR MAMMO BI INCL CAD: CPT

## 2023-08-07 PROCEDURE — 77063 BREAST TOMOSYNTHESIS BI: CPT

## 2023-11-01 ENCOUNTER — TRANSCRIBE ORDERS (OUTPATIENT)
Dept: ADMINISTRATIVE | Facility: HOSPITAL | Age: 48
End: 2023-11-01
Payer: MEDICAID

## 2023-11-01 DIAGNOSIS — Z12.31 ENCOUNTER FOR SCREENING MAMMOGRAM FOR MALIGNANT NEOPLASM OF BREAST: Primary | ICD-10-CM

## 2024-11-05 ENCOUNTER — TRANSCRIBE ORDERS (OUTPATIENT)
Dept: ADMINISTRATIVE | Facility: HOSPITAL | Age: 49
End: 2024-11-05
Payer: MEDICAID

## 2024-11-05 DIAGNOSIS — Z12.31 ENCOUNTER FOR SCREENING MAMMOGRAM FOR MALIGNANT NEOPLASM OF BREAST: Primary | ICD-10-CM

## 2025-03-03 ENCOUNTER — HOSPITAL ENCOUNTER (OUTPATIENT)
Dept: MAMMOGRAPHY | Facility: HOSPITAL | Age: 50
Discharge: HOME | End: 2025-03-03
Admitting: FAMILY MEDICINE
Payer: COMMERCIAL

## 2025-03-03 DIAGNOSIS — Z12.31 ENCOUNTER FOR SCREENING MAMMOGRAM FOR MALIGNANT NEOPLASM OF BREAST: ICD-10-CM

## 2025-03-03 PROCEDURE — 77063 BREAST TOMOSYNTHESIS BI: CPT

## 2025-03-03 PROCEDURE — 77067 SCR MAMMO BI INCL CAD: CPT

## 2025-06-27 ENCOUNTER — APPOINTMENT (OUTPATIENT)
Dept: GENERAL RADIOLOGY | Facility: HOSPITAL | Age: 50
End: 2025-06-27
Payer: COMMERCIAL

## 2025-06-27 ENCOUNTER — HOSPITAL ENCOUNTER (EMERGENCY)
Facility: HOSPITAL | Age: 50
Discharge: HOME | End: 2025-06-27
Attending: EMERGENCY MEDICINE
Payer: COMMERCIAL

## 2025-06-27 VITALS
OXYGEN SATURATION: 98 % | TEMPERATURE: 98.3 F | HEART RATE: 119 BPM | SYSTOLIC BLOOD PRESSURE: 145 MMHG | DIASTOLIC BLOOD PRESSURE: 96 MMHG | BODY MASS INDEX: 44.04 KG/M2 | WEIGHT: 257.94 LBS | HEIGHT: 64 IN | RESPIRATION RATE: 18 BRPM

## 2025-06-27 DIAGNOSIS — M25.552 LEFT HIP PAIN: ICD-10-CM

## 2025-06-27 DIAGNOSIS — M79.652 PAIN IN BOTH THIGHS: ICD-10-CM

## 2025-06-27 DIAGNOSIS — M79.651 PAIN IN BOTH THIGHS: ICD-10-CM
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
## 2025-06-27 LAB — B-HCG UR QL: NEGATIVE

## 2025-06-27 PROCEDURE — 73552 X-RAY EXAM OF FEMUR 2/>: CPT

## 2025-06-27 PROCEDURE — 73502 X-RAY EXAM HIP UNI 2-3 VIEWS: CPT

## 2025-06-27 PROCEDURE — 81025 URINE PREGNANCY TEST: CPT | Performed by: PHYSICIAN ASSISTANT

## 2025-06-27 PROCEDURE — 99283 EMERGENCY DEPT VISIT LOW MDM: CPT | Performed by: EMERGENCY MEDICINE

## 2025-06-27 PROCEDURE — 25010000002 ORPHENADRINE CITRATE PER 60 MG: Performed by: PHYSICIAN ASSISTANT

## 2025-06-27 PROCEDURE — 96372 THER/PROPH/DIAG INJ SC/IM: CPT

## 2025-06-27 RX ORDER — ORPHENADRINE CITRATE 100 MG/1
100 TABLET ORAL 2 TIMES DAILY
Qty: 12 TABLET | Refills: 0 | Status: SHIPPED | OUTPATIENT
Start: 2025-06-27

## 2025-06-27 RX ORDER — ORPHENADRINE CITRATE 30 MG/ML
60 INJECTION INTRAMUSCULAR; INTRAVENOUS ONCE
Status: COMPLETED | OUTPATIENT
Start: 2025-06-27 | End: 2025-06-27

## 2025-06-27 RX ADMIN — ORPHENADRINE CITRATE 60 MG: 30 INJECTION, SOLUTION INTRAMUSCULAR; INTRAVENOUS at 16:01

## 2025-06-27 NOTE — DISCHARGE INSTRUCTIONS
You may ice and heat to areas of pain.  Take Tylenol as needed per directions on the package.  Take muscle laxer's as prescribed as needed.  Follow-up with your primary care provider for further outpatient evaluation if symptoms persist.  Return to the ER for new or worsening symptoms or acute concerns.

## 2025-06-27 NOTE — ED PROVIDER NOTES
Subjective:  Chief Complaint:  Hip and leg pain    History of Present Illness:  Patient is a 50-year-old female with history of fatty liver and hypertension presenting to the ER with complaints of left hip pain and pain radiating down both thighs.  Patient states that she had a fall on Wednesday, 2 days ago.  States that she essentially did the splits.  States she was bowling and was either going to get hit in the head with a bowling ball or fall and she chose to fall.  States that she hit her left side and essentially did the splits and has been having severe pain to the thighs and left hip since then.  States she is not supposed take NSAIDs because she has some history of kidney dysfunction.  States that she also has fatty liver and is post to be cautious with Tylenol.  States that she has not really had anything to help with the pain.  States she does have Flexeril and took a dose of that but it did not seem to really help.  Denies pain to back or spine.  Denies any urinary symptoms, saddle anesthesia, and additional symptoms or complaints at this time.      Nurses Notes reviewed and agree, including vitals, allergies, social history and prior medical history.     REVIEW OF SYSTEMS: All systems reviewed and not pertinent unless noted.  Review of Systems   Musculoskeletal:         Left hip pain and bilateral thigh pain   All other systems reviewed and are negative.      Past Medical History:   Diagnosis Date    Fatty liver     Hypertension        Allergies:    Adhesive tape and Latex      Past Surgical History:   Procedure Laterality Date    CHOLECYSTECTOMY      TUBAL ABDOMINAL LIGATION           Social History     Socioeconomic History    Marital status: Single   Tobacco Use    Smoking status: Former    Smokeless tobacco: Never   Substance and Sexual Activity    Alcohol use: No    Drug use: No    Sexual activity: Defer         Family History   Problem Relation Age of Onset    No Known Problems Mother     Diabetes  "Paternal Aunt     Breast cancer Neg Hx        Objective  Physical Exam:  /96   Pulse 119   Temp 98.3 °F (36.8 °C) (Oral)   Resp 18   Ht 162.6 cm (64.02\")   Wt 117 kg (257 lb 15 oz)   SpO2 98%   BMI 44.25 kg/m²      Physical Exam  Vitals and nursing note reviewed.   Constitutional:       General: She is not in acute distress.     Appearance: She is not toxic-appearing.   HENT:      Head: Normocephalic and atraumatic.      Right Ear: External ear normal.      Left Ear: External ear normal.      Nose: Nose normal.   Eyes:      Extraocular Movements: Extraocular movements intact.      Conjunctiva/sclera: Conjunctivae normal.   Cardiovascular:      Rate and Rhythm: Tachycardia present.   Pulmonary:      Effort: Pulmonary effort is normal. No respiratory distress.   Abdominal:      General: There is no distension.   Musculoskeletal:         General: Normal range of motion.      Cervical back: Normal range of motion and neck supple.      Comments: Bilateral extremities are neurovascularly intact with cap refill less than 2 seconds, sensation intact, 2+ pulses, no obvious deformity    No midline point tenderness to spine and no step-off or deformity   Skin:     General: Skin is warm and dry.   Neurological:      General: No focal deficit present.      Mental Status: She is alert and oriented to person, place, and time.   Psychiatric:         Behavior: Behavior normal.         Procedures    ED Course:         Lab Results (last 24 hours)       Procedure Component Value Units Date/Time    Pregnancy, Urine - Urine, Clean Catch [[id number]]  (Normal) Collected: 06/27/25 1606    Specimen: Urine, Clean Catch Updated: 06/27/25 1636     HCG, Urine QL Negative             XR Hip With or Without Pelvis 2 - 3 View Left  Result Date: 6/27/2025  PROCEDURE: XR HIP W OR WO PELVIS 2-3 VIEW LEFT-  HISTORY: hip pain after falling and doing splits 2 days ago, pain to bilateral thighs as well  COMPARISON: None.  FINDINGS:  A two " view exam demonstrates no acute fracture or dislocation. The joint spaces appear unremarkable. No soft tissue abnormality is seen.      Impression: No acute bony abnormality.      This report was signed and finalized on 6/27/2025 4:46 PM by Noemí Zamora MD.      XR Femur 2 View Bilateral  Result Date: 6/27/2025  PROCEDURE: XR FEMUR 2 VW BILATERAL-  HISTORY: Fall 2 days ago, pain down both thighs, proximal thigh pain after doing splits 2 days ago.  COMPARISON: None.  FINDINGS:  A four view exam demonstrates no acute fracture or dislocation. The joint spaces mild narrowing of the right patellofemoral compartment with small osteophytes. There are at least 2 calcifications superior to the patella that may be within the quadriceps tendon. No definite effusion seen. Mild narrowing of the left patellofemoral compartment with small osteophytes seen. Metallic surgical clip noted in the pelvis.      Impression: No acute bony abnormality.  Degenerative change.    This report was signed and finalized on 6/27/2025 4:42 PM by Noemí Zamora MD.           MDM  Patient evaluated in the ER for left hip pain and bilateral thigh pain after falling while bowling.  Patient tachycardic but otherwise hemodynamically stable, afebrile, nontoxic-appearing on exam.  Differential diagnosis includes but is not limited to fracture, sprain/strain, among others.  Initial plan includes x-ray of the left hip/pelvis and bilateral femur x-rays.  Will treat with Norflex injection.    X-rays are unremarkable.  Patient does state that the Norflex has seemed to help.  She is agreeable with plan for discharge.  Recommended follow-up with PCP for further outpatient evaluation if symptoms persist.  Precautions were given for return to the ER for any new or worsening symptoms.    Final diagnoses:   Fall, initial encounter   Left hip pain   Pain in both thighs          Natacha Green PA-C  06/27/25 0021